# Patient Record
Sex: FEMALE | Race: BLACK OR AFRICAN AMERICAN
[De-identification: names, ages, dates, MRNs, and addresses within clinical notes are randomized per-mention and may not be internally consistent; named-entity substitution may affect disease eponyms.]

---

## 2017-07-21 ENCOUNTER — HOSPITAL ENCOUNTER (EMERGENCY)
Dept: HOSPITAL 17 - NED | Age: 22
Discharge: LEFT BEFORE DISCHARGE | End: 2017-07-21
Payer: SELF-PAY

## 2017-07-21 VITALS
SYSTOLIC BLOOD PRESSURE: 189 MMHG | DIASTOLIC BLOOD PRESSURE: 84 MMHG | OXYGEN SATURATION: 98 % | HEART RATE: 84 BPM | TEMPERATURE: 98.2 F | RESPIRATION RATE: 16 BRPM

## 2017-07-21 VITALS — BODY MASS INDEX: 45 KG/M2 | HEIGHT: 66 IN | WEIGHT: 279.99 LBS

## 2017-07-21 DIAGNOSIS — K92.9: Primary | ICD-10-CM

## 2017-07-21 PROCEDURE — 99281 EMR DPT VST MAYX REQ PHY/QHP: CPT

## 2018-06-12 ENCOUNTER — HOSPITAL ENCOUNTER (EMERGENCY)
Dept: HOSPITAL 17 - NEPD | Age: 23
Discharge: HOME | End: 2018-06-12
Payer: SELF-PAY

## 2018-06-12 VITALS — SYSTOLIC BLOOD PRESSURE: 141 MMHG | DIASTOLIC BLOOD PRESSURE: 70 MMHG

## 2018-06-12 VITALS
RESPIRATION RATE: 17 BRPM | DIASTOLIC BLOOD PRESSURE: 68 MMHG | SYSTOLIC BLOOD PRESSURE: 161 MMHG | TEMPERATURE: 98.5 F | HEART RATE: 109 BPM | OXYGEN SATURATION: 100 %

## 2018-06-12 VITALS — WEIGHT: 220.46 LBS | HEIGHT: 66 IN | BODY MASS INDEX: 35.43 KG/M2

## 2018-06-12 VITALS — OXYGEN SATURATION: 99 %

## 2018-06-12 DIAGNOSIS — R53.1: ICD-10-CM

## 2018-06-12 DIAGNOSIS — R11.0: ICD-10-CM

## 2018-06-12 DIAGNOSIS — R42: ICD-10-CM

## 2018-06-12 DIAGNOSIS — J32.9: Primary | ICD-10-CM

## 2018-06-12 LAB
ALBUMIN SERPL-MCNC: 3.6 GM/DL (ref 3.4–5)
ALP SERPL-CCNC: 99 U/L (ref 45–117)
ALT SERPL-CCNC: 17 U/L (ref 10–53)
AST SERPL-CCNC: 12 U/L (ref 15–37)
BACTERIA #/AREA URNS HPF: (no result) /HPF
BASOPHILS # BLD AUTO: 0 TH/MM3 (ref 0–0.2)
BASOPHILS NFR BLD: 0.4 % (ref 0–2)
BILIRUB SERPL-MCNC: 0.2 MG/DL (ref 0.2–1)
BUN SERPL-MCNC: 10 MG/DL (ref 7–18)
CALCIUM SERPL-MCNC: 8.7 MG/DL (ref 8.5–10.1)
CHLORIDE SERPL-SCNC: 107 MEQ/L (ref 98–107)
COLOR UR: (no result)
CREAT SERPL-MCNC: 0.9 MG/DL (ref 0.5–1)
EOSINOPHIL # BLD: 0.2 TH/MM3 (ref 0–0.4)
EOSINOPHIL NFR BLD: 2.1 % (ref 0–4)
ERYTHROCYTE [DISTWIDTH] IN BLOOD BY AUTOMATED COUNT: 16.5 % (ref 11.6–17.2)
GFR SERPLBLD BASED ON 1.73 SQ M-ARVRAT: 92 ML/MIN (ref 89–?)
GLUCOSE SERPL-MCNC: 84 MG/DL (ref 74–106)
GLUCOSE UR STRIP-MCNC: (no result) MG/DL
HCO3 BLD-SCNC: 25.9 MEQ/L (ref 21–32)
HCT VFR BLD CALC: 32.6 % (ref 35–46)
HGB BLD-MCNC: 10.2 GM/DL (ref 11.6–15.3)
HGB UR QL STRIP: (no result)
INR PPP: 1 RATIO
KETONES UR STRIP-MCNC: (no result) MG/DL
LYMPHOCYTES # BLD AUTO: 2.4 TH/MM3 (ref 1–4.8)
LYMPHOCYTES NFR BLD AUTO: 30.1 % (ref 9–44)
MAGNESIUM SERPL-MCNC: 2 MG/DL (ref 1.5–2.5)
MCH RBC QN AUTO: 25.1 PG (ref 27–34)
MCHC RBC AUTO-ENTMCNC: 31.4 % (ref 32–36)
MCV RBC AUTO: 79.9 FL (ref 80–100)
MONOCYTE #: 0.7 TH/MM3 (ref 0–0.9)
MONOCYTES NFR BLD: 9.3 % (ref 0–8)
MUCOUS THREADS #/AREA URNS LPF: (no result) /LPF
NEUTROPHILS # BLD AUTO: 4.7 TH/MM3 (ref 1.8–7.7)
NEUTROPHILS NFR BLD AUTO: 58.1 % (ref 16–70)
NITRITE UR QL STRIP: (no result)
PLATELET # BLD: 374 TH/MM3 (ref 150–450)
PMV BLD AUTO: 8.7 FL (ref 7–11)
PROT SERPL-MCNC: 7.5 GM/DL (ref 6.4–8.2)
PROTHROMBIN TIME: 10.1 SEC (ref 9.8–11.6)
RBC # BLD AUTO: 4.09 MIL/MM3 (ref 4–5.3)
SODIUM SERPL-SCNC: 141 MEQ/L (ref 136–145)
SP GR UR STRIP: 1.03 (ref 1–1.03)
SQUAMOUS #/AREA URNS HPF: 4 /HPF (ref 0–5)
TROPONIN I SERPL-MCNC: (no result) NG/ML (ref 0.02–0.05)
URINE LEUKOCYTE ESTERASE: (no result)
WBC # BLD AUTO: 8 TH/MM3 (ref 4–11)

## 2018-06-12 PROCEDURE — 99284 EMERGENCY DEPT VISIT MOD MDM: CPT

## 2018-06-12 PROCEDURE — 84484 ASSAY OF TROPONIN QUANT: CPT

## 2018-06-12 PROCEDURE — 81001 URINALYSIS AUTO W/SCOPE: CPT

## 2018-06-12 PROCEDURE — 93005 ELECTROCARDIOGRAM TRACING: CPT

## 2018-06-12 PROCEDURE — 83735 ASSAY OF MAGNESIUM: CPT

## 2018-06-12 PROCEDURE — 80053 COMPREHEN METABOLIC PANEL: CPT

## 2018-06-12 PROCEDURE — 87086 URINE CULTURE/COLONY COUNT: CPT

## 2018-06-12 PROCEDURE — 84703 CHORIONIC GONADOTROPIN ASSAY: CPT

## 2018-06-12 PROCEDURE — 87186 SC STD MICRODIL/AGAR DIL: CPT

## 2018-06-12 PROCEDURE — 85025 COMPLETE CBC W/AUTO DIFF WBC: CPT

## 2018-06-12 PROCEDURE — 85730 THROMBOPLASTIN TIME PARTIAL: CPT

## 2018-06-12 PROCEDURE — 85610 PROTHROMBIN TIME: CPT

## 2018-06-12 PROCEDURE — 87077 CULTURE AEROBIC IDENTIFY: CPT

## 2018-06-12 NOTE — PD
HPI


Chief Complaint:  ENT Complaint


Time Seen by Provider:  17:23


Travel History


International Travel<30 days:  No


Contact w/Intl Traveler<30days:  No


Traveled to known affect area:  No





History of Present Illness


HPI


Patient is a 23-year-old female presents emergency department for a chief 

complaint of dizziness and feeling like she might pass out.  Patient initially 

told triage that was here was here for sinusitis but she states to me that "I 

know I have sinusitis but really wanted to be evaluated for dizziness".  States 

that she has been sick for the past few days but the dizziness started today, 

felt more like she was going to pass out then vertiginous symptoms.  No chest 

pain no shortness of breath abdominal pain or vomiting.  She does endorse some 

mild nausea.  States no history of anemia and this is never happened to her 

before.  Symptoms mild, constant, duration context and associated symptoms as 

above





PFSH


Past Medical History


Integumentary:  Yes (eczema)


Pregnant?:  Unknown


LMP:  6/10/2018


:  1


Para:  1





Social History


Alcohol Use:  No


Tobacco Use:  No


Substance Use:  No





Allergies-Medications


(Allergen,Severity, Reaction):  


Coded Allergies:  


     No Known Allergies (Verified  Adverse Reaction, Unknown, 18)


Reported Meds & Prescriptions





Reported Meds & Active Scripts


Active


Zofran (Ondansetron HCl) 4 Mg Tab 4 Mg PO Q6HR PRN


Penicillin V Potassium 500 Mg Tab 500 Mg PO Q6H 7 Days


Meclizine (Meclizine HCl) 25 Mg Tab 25 Mg PO TID PRN








Review of Systems


Except as stated in HPI:  all other systems reviewed are Neg





Physical Exam


Narrative


GENERAL: Well-developed well-nourished overweight female in no obvious distress

, appears well and pleasant.


SKIN: Focused skin assessment warm/dry.


HEAD: Atraumatic. Normocephalic. 


EYES: Pupils equal and round. No scleral icterus. No injection or drainage. 


ENT: No nasal bleeding or discharge.  Mucous membranes pink and moist.  TMs 

clear bilaterally, oropharynx clear moist.  Easily voice.


NECK: Trachea midline. No JVD. 


CARDIOVASCULAR: Regular rate and rhythm.  No murmur appreciated.


RESPIRATORY: No accessory muscle use. Clear to auscultation. Breath sounds 

equal bilaterally. 


GASTROINTESTINAL: Abdomen soft, non-tender, nondistended. Hepatic and splenic 

margins not palpable. 


MUSCULOSKELETAL: No obvious deformities. No clubbing.  No cyanosis.  No edema. 


NEUROLOGICAL: Awake and alert.  Cranial nerves II through XII grossly intact 

and nonfocal, 5 out of 5 strength in all 4 extremities, cerebellar testing 

negative, embolus with an even narrow-base gait.


PSYCHIATRIC: Appropriate mood and affect; insight and judgment normal.





Data


Data


Last Documented VS





Vital Signs








  Date Time  Temp Pulse Resp B/P (MAP) Pulse Ox O2 Delivery O2 Flow Rate FiO2


 


18 20:12  101 18 141/70 (93) 100   


 


18 18:00      Room Air  


 


18 16:19 98.5       








Orders





 Orders


Electrocardiogram (18 17:43)


Complete Blood Count With Diff (18 17:43)


Comprehensive Metabolic Panel (18 17:43)


Magnesium (Mg) (18 17:43)


Prothrombin Time / Inr (Pt) (18 17:43)


Act Partial Throm Time (Ptt) (18 17:43)


Troponin I (18 17:43)


Ecg Monitoring (18 17:43)


Iv Access Insert/Monitor (18 17:43)


Oximetry (18 17:43)


Oxygen Administration (18 17:43)


Sodium Chloride 0.9% Flush (Ns Flush) (18 17:45)


Urinalysis - C+S If Indicated (18 17:43)


Ed Urine Pregnancytest Poc (18 17:43)


Urine Culture (18 18:30)


Ed Discharge Order (18 19:56)





Labs





Laboratory Tests








Test


  18


18:30


 


White Blood Count 8.0 TH/MM3 


 


Red Blood Count 4.09 MIL/MM3 


 


Hemoglobin 10.2 GM/DL 


 


Hematocrit 32.6 % 


 


Mean Corpuscular Volume 79.9 FL 


 


Mean Corpuscular Hemoglobin 25.1 PG 


 


Mean Corpuscular Hemoglobin


Concent 31.4 % 


 


 


Red Cell Distribution Width 16.5 % 


 


Platelet Count 374 TH/MM3 


 


Mean Platelet Volume 8.7 FL 


 


Neutrophils (%) (Auto) 58.1 % 


 


Lymphocytes (%) (Auto) 30.1 % 


 


Monocytes (%) (Auto) 9.3 % 


 


Eosinophils (%) (Auto) 2.1 % 


 


Basophils (%) (Auto) 0.4 % 


 


Neutrophils # (Auto) 4.7 TH/MM3 


 


Lymphocytes # (Auto) 2.4 TH/MM3 


 


Monocytes # (Auto) 0.7 TH/MM3 


 


Eosinophils # (Auto) 0.2 TH/MM3 


 


Basophils # (Auto) 0.0 TH/MM3 


 


CBC Comment DIFF FINAL 


 


Differential Comment  


 


Prothrombin Time 10.1 SEC 


 


Prothromb Time International


Ratio 1.0 RATIO 


 


 


Activated Partial


Thromboplast Time 31.1 SEC 


 


 


Urine Color LIGHT-BROWN 


 


Urine Turbidity HAZY 


 


Urine pH 6.0 


 


Urine Specific Gravity 1.029 


 


Urine Protein 30 mg/dL 


 


Urine Glucose (UA) NEG mg/dL 


 


Urine Ketones TRACE mg/dL 


 


Urine Occult Blood LARGE 


 


Urine Nitrite NEG 


 


Urine Bilirubin NEG 


 


Urine Urobilinogen


  LESS THAN 2.0


MG/DL


 


Urine Leukocyte Esterase SMALL 


 


Urine RBC  /hpf 


 


Urine WBC 23 /hpf 


 


Urine Squamous Epithelial


Cells 4 /hpf 


 


 


Urine Bacteria OCC /hpf 


 


Urine Mucus FEW /lpf 


 


Microscopic Urinalysis Comment


  CULTURE


INDICATED


 


Blood Urea Nitrogen 10 MG/DL 


 


Creatinine 0.90 MG/DL 


 


Random Glucose 84 MG/DL 


 


Total Protein 7.5 GM/DL 


 


Albumin 3.6 GM/DL 


 


Calcium Level 8.7 MG/DL 


 


Magnesium Level 2.0 MG/DL 


 


Alkaline Phosphatase 99 U/L 


 


Aspartate Amino Transf


(AST/SGOT) 12 U/L 


 


 


Alanine Aminotransferase


(ALT/SGPT) 17 U/L 


 


 


Total Bilirubin 0.2 MG/DL 


 


Sodium Level 141 MEQ/L 


 


Potassium Level 3.9 MEQ/L 


 


Chloride Level 107 MEQ/L 


 


Carbon Dioxide Level 25.9 MEQ/L 


 


Anion Gap 8 MEQ/L 


 


Estimat Glomerular Filtration


Rate 92 ML/MIN 


 


 


Troponin I


  LESS THAN 0.02


NG/ML











[facility]


Medical Decision Making


Medical Screen Exam Complete:  Yes


Emergency Medical Condition:  Yes


Differential Diagnosis


Sinusitis, vertigo, labyrinthitis, anemia, arrhythmia.


Narrative Course


Patient room to the emergency department, she is requesting lab work because 

she is feeling like she is going to pass out.  I think is reasonable to oblige 

her request given her symptomology.  She does have some mild anemia otherwise 

electrolytes within normal limits creatinine and liver function tests within 

normal limits.  EKG normal.  He does have sinusitis and the symptoms probably 

are due to labyrinthitis just a poor description of vertigo on the patient's 

part.  I discussed that I would recommend symptomatic management for now and 

follow-up with a primary care physician and she is reasonable to these 

recommendations.  She stable for discharge





Diagnosis





 Primary Impression:  


 Sinusitis


 Qualified Codes:  J32.9 - Chronic sinusitis, unspecified


 Additional Impressions:  


 Vertigo


 Weakness


Referrals:  


[facility]


***Med/Other Pt SpecificInfo:  Prescription(s) given


Scripts


Ondansetron (Zofran) 4 Mg Tab


4 MG PO Q6HR Y for NAUSEA OR VOMITING, #20 TAB 0 Refills


   Prov: Roger Lobato MD         18 


Penicillin V Potassium (Penicillin V Potassium) 500 Mg Tab


500 MG PO Q6H for Infection for 7 Days, #28 TAB 0 Refills


   Prov: Roger Lobato MD         18 


Meclizine (Meclizine) 25 Mg Tab


25 MG PO TID Y for VERTIGO, #20 TAB 0 Refills


   Prov: Roger Lobato MD         18


Disposition:  01 DISCHARGE HOME


Condition:  Stable











Roger Lobato MD 2018 17:44

## 2018-06-13 NOTE — EKG
Date Performed: 06/12/2018       Time Performed: 18:40:13

 

PTAGE:      23 years

 

EKG:      Sinus rhythm 

 

 NORMAL ECG 

 

NO PREVIOUS TRACING            

 

DOCTOR:   Newton Velázquez  Interpretating Date/Time  06/13/2018 09:10:40